# Patient Record
Sex: MALE | Race: ASIAN | NOT HISPANIC OR LATINO | Employment: FULL TIME | URBAN - METROPOLITAN AREA
[De-identification: names, ages, dates, MRNs, and addresses within clinical notes are randomized per-mention and may not be internally consistent; named-entity substitution may affect disease eponyms.]

---

## 2020-03-26 ENCOUNTER — TELEPHONE (OUTPATIENT)
Dept: SCHEDULING | Facility: IMAGING CENTER | Age: 35
End: 2020-03-26

## 2020-03-31 ENCOUNTER — APPOINTMENT (OUTPATIENT)
Dept: MEDICAL GROUP | Facility: MEDICAL CENTER | Age: 35
End: 2020-03-31
Payer: COMMERCIAL

## 2020-06-22 ENCOUNTER — TELEPHONE (OUTPATIENT)
Dept: SCHEDULING | Facility: IMAGING CENTER | Age: 35
End: 2020-06-22

## 2020-07-21 ENCOUNTER — OFFICE VISIT (OUTPATIENT)
Dept: MEDICAL GROUP | Facility: MEDICAL CENTER | Age: 35
End: 2020-07-21
Payer: COMMERCIAL

## 2020-07-21 VITALS
RESPIRATION RATE: 16 BRPM | HEIGHT: 67 IN | SYSTOLIC BLOOD PRESSURE: 116 MMHG | HEART RATE: 85 BPM | BODY MASS INDEX: 21.8 KG/M2 | TEMPERATURE: 97.1 F | DIASTOLIC BLOOD PRESSURE: 61 MMHG | OXYGEN SATURATION: 95 % | WEIGHT: 138.89 LBS

## 2020-07-21 DIAGNOSIS — Z76.89 ENCOUNTER TO ESTABLISH CARE: Primary | ICD-10-CM

## 2020-07-21 DIAGNOSIS — G89.29 CHRONIC NONINTRACTABLE HEADACHE, UNSPECIFIED HEADACHE TYPE: ICD-10-CM

## 2020-07-21 DIAGNOSIS — R51.9 CHRONIC NONINTRACTABLE HEADACHE, UNSPECIFIED HEADACHE TYPE: ICD-10-CM

## 2020-07-21 DIAGNOSIS — R13.10 DYSPHAGIA, UNSPECIFIED TYPE: ICD-10-CM

## 2020-07-21 DIAGNOSIS — J30.9 ALLERGIC RHINITIS, UNSPECIFIED SEASONALITY, UNSPECIFIED TRIGGER: ICD-10-CM

## 2020-07-21 DIAGNOSIS — J34.9 SINUS PROBLEM: ICD-10-CM

## 2020-07-21 DIAGNOSIS — Z23 NEED FOR VACCINATION: ICD-10-CM

## 2020-07-21 DIAGNOSIS — E78.49 OTHER HYPERLIPIDEMIA: ICD-10-CM

## 2020-07-21 PROBLEM — R13.12 OROPHARYNGEAL DYSPHAGIA: Status: ACTIVE | Noted: 2020-07-21

## 2020-07-21 PROCEDURE — 90471 IMMUNIZATION ADMIN: CPT | Performed by: FAMILY MEDICINE

## 2020-07-21 PROCEDURE — 99204 OFFICE O/P NEW MOD 45 MIN: CPT | Mod: 25 | Performed by: FAMILY MEDICINE

## 2020-07-21 PROCEDURE — 90715 TDAP VACCINE 7 YRS/> IM: CPT | Performed by: FAMILY MEDICINE

## 2020-07-21 RX ORDER — SUMATRIPTAN 25 MG/1
TABLET, FILM COATED ORAL
Qty: 30 TAB | Refills: 1 | Status: SHIPPED | OUTPATIENT
Start: 2020-07-21 | End: 2020-09-23 | Stop reason: SDUPTHER

## 2020-07-21 RX ORDER — FEXOFENADINE HCL 180 MG/1
180 TABLET ORAL DAILY
Qty: 90 TAB | Refills: 1 | Status: SHIPPED | OUTPATIENT
Start: 2020-07-21 | End: 2021-10-08

## 2020-07-21 RX ORDER — FEXOFENADINE HCL 180 MG/1
180 TABLET ORAL DAILY
COMMUNITY
End: 2020-07-21 | Stop reason: SDUPTHER

## 2020-07-21 SDOH — HEALTH STABILITY: MENTAL HEALTH: HOW OFTEN DO YOU HAVE A DRINK CONTAINING ALCOHOL?: NEVER

## 2020-07-21 ASSESSMENT — ENCOUNTER SYMPTOMS
HEADACHES: 1
SINUS PAIN: 0
FEVER: 0
FOCAL WEAKNESS: 0
ABDOMINAL PAIN: 0
PALPITATIONS: 0
VOMITING: 0
DIZZINESS: 0
CONSTIPATION: 0
CHILLS: 0
DIARRHEA: 0
MYALGIAS: 0
DEPRESSION: 0
EYE PAIN: 0
COUGH: 0
WHEEZING: 0
HEMOPTYSIS: 0
EYE REDNESS: 0
WEIGHT LOSS: 0
NAUSEA: 0
SPUTUM PRODUCTION: 0
SENSORY CHANGE: 0
SHORTNESS OF BREATH: 0
NERVOUS/ANXIOUS: 0
EYE DISCHARGE: 0

## 2020-07-21 ASSESSMENT — PATIENT HEALTH QUESTIONNAIRE - PHQ9: CLINICAL INTERPRETATION OF PHQ2 SCORE: 0

## 2020-07-21 ASSESSMENT — LIFESTYLE VARIABLES: SUBSTANCE_ABUSE: 0

## 2020-07-21 NOTE — ASSESSMENT & PLAN NOTE
This is a chronic problem for this patient.  He reports that he is currently not on any medication.  He is trying to manage with diet.

## 2020-07-21 NOTE — PROGRESS NOTES
FAMILY MEDICINE VISIT                                                               Chief complaint::The primary encounter diagnosis was Encounter to establish care. Diagnoses of Other hyperlipidemia, Dysphagia, unspecified type, Allergic rhinitis, unspecified seasonality, unspecified trigger, Chronic nonintractable headache, unspecified headache type, Need for vaccination, and Sinus problem were also pertinent to this visit.    History of present illness: Jeanmarie Amaya is a 34 y.o. male who presented to establish care.  Patient is here with her wife and nephew.      Other hyperlipidemia  This is a chronic problem for this patient.  He reports that he is currently not on any medication.  He is trying to manage with diet.    Oropharyngeal dysphagia  Patient reports that he is having difficulty swallowing all foods intermittently since a year.  He reports that it is getting worse.  He reports that he has family history of laryngeal cancer in her mother.  He denies any weight loss.  He reports that he used to smoke and smoked for 6 years but now he is vaping.    Allergic rhinitis  This is a chronic problem for this patient.  He is using Flonase and fexofenadine daily for his allergy symptoms.  He reports that his allergies are controlled with these medications.    Sinus problem  This is a chronic problem for this patient.  Patient moved from [facility].  He reports that he had evaluation there and was diagnosed with sinus cyst, he does not have any records currently.  He reports that he does not know which sinus he had cyst.  He sometimes get pain in his frontal sinuses.  He will get records from [facility] and will bring at next visit    Chronic nonintractable headache  This is a new problem for this patient.  He reports that this started when he moved to .  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  Headache causes him dizziness also.  He is not taking any medication  for headache.  He reports the headache is all over the head, denies any nausea, vomiting, photophobia and phonophobia.  Gets better on its own.    Review of systems:     Review of Systems   Constitutional: Negative for chills, fever, malaise/fatigue and weight loss.   HENT: Negative for ear discharge, ear pain, hearing loss and sinus pain.         Difficulty swallowing food.   Eyes: Negative for pain, discharge and redness.   Respiratory: Negative for cough, hemoptysis, sputum production, shortness of breath and wheezing.    Cardiovascular: Negative for chest pain, palpitations and leg swelling.   Gastrointestinal: Negative for abdominal pain, constipation, diarrhea, nausea and vomiting.   Genitourinary: Negative for dysuria, frequency and urgency.   Musculoskeletal: Negative for joint pain and myalgias.   Skin: Negative for itching and rash.   Neurological: Positive for headaches. Negative for dizziness, sensory change and focal weakness.        Headaches sometimes associated with dizziness   Endo/Heme/Allergies: Negative for environmental allergies.   Psychiatric/Behavioral: Negative for depression, substance abuse and suicidal ideas. The patient is not nervous/anxious.         Past Medical, Surgical and Family History:    Past Medical History:   Diagnosis Date   • Hyperlipidemia      Past Surgical History:   Procedure Laterality Date   • OTHER      chest tube placement     Family History   Problem Relation Age of Onset   • Cancer Mother         Laryngeal cancer   • Cancer Sister         colon cancer at 52 year age.        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     • fexofenadine (ALLEGRA) 180 MG tablet Take 1 Tab by mouth every day. 90 Tab 1   • SUMAtriptan (IMITREX) 25 MG Tab tablet Take 1 tablet by mouth as needed for headache, can take another tablet by mouth in 2 hours if not better, Max: 100 mg/day 30 Tab 1     No current facility-administered medications for this visit.         Not on File    Vitals:    /61 (BP Location: Left arm, Patient Position: Sitting, BP Cuff Size: Adult)   Pulse 85   Temp 36.2 °C (97.1 °F)   Resp 16   Ht 1.702 m (5' 7\")   Wt 63 kg (138 lb 14.2 oz)   SpO2 95%  Body mass index is 21.75 kg/m².    Physical Exam:     Physical Exam   Constitutional: He is oriented to person, place, and time and well-developed, well-nourished, and in no distress. No distress.   HENT:   Head: Normocephalic and atraumatic.   Right Ear: External ear normal.   Left Ear: External ear normal.   Nose: Nose normal.   Mouth/Throat: Oropharynx is clear and moist. No oropharyngeal exudate.   No sinus tenderness.   Eyes: Conjunctivae and EOM are normal. Right eye exhibits no discharge. Left eye exhibits no discharge.   Neck: Neck supple. No thyromegaly present.   Cardiovascular: Normal rate, regular rhythm, normal heart sounds and intact distal pulses.   No murmur heard.  Pulmonary/Chest: Effort normal and breath sounds normal. No respiratory distress. He has no wheezes. He has no rales.   Abdominal: Soft. Bowel sounds are normal. He exhibits no distension. There is no abdominal tenderness. There is no guarding.   Musculoskeletal:         General: No tenderness, deformity or edema.   Lymphadenopathy:     He has no cervical adenopathy.   Neurological: He is alert and oriented to person, place, and time. He exhibits normal muscle tone.   Skin: Skin is warm. No rash noted. He is not " diaphoretic.   Psychiatric: Mood, affect and judgment normal.        Assessment/Plan:    Jeanmarie was seen today for establish care.    Diagnoses and all orders for this visit:    Encounter to establish care    Other hyperlipidemia:  · Advised healthy diet and aerobic exercise.  · Check labs CMP, lipid panel, thyroid function test.    -     Comp Metabolic Panel; Future  -     Lipid Profile; Future  -     TSH WITH REFLEX TO FT4; Future    Dysphagia, unspecified type:  · Ordered barium swallow for dysphagia.    -     DX-ESOPHAGUS - [id number]; Future    Allergic rhinitis, unspecified seasonality, unspecified trigger:  · Controlled with current regimen.  · Continue same medication regimen.    -     fexofenadine (ALLEGRA) 180 MG tablet; Take 1 Tab by mouth every day.    Chronic nonintractable headache, unspecified headache type:  · Advised patient to drink at least 64 ounces of water in a day.  · We will start her on sumatriptan as needed for headache.  · Check labs CBC, CMP, thyroid function test, lipid panel.    -     CBC WITHOUT DIFFERENTIAL; Future  -     TSH WITH REFLEX TO FT4; Future  -     SUMAtriptan (IMITREX) 25 MG Tab tablet; Take 1 tablet by mouth as needed for headache, can take another tablet by mouth in 2 hours if not better, Max: 100 mg/day    Need for vaccination:  · Tdap vaccination given today.    -     Tdap =>6yo IM    Sinus problem  · Patient will get previous records regarding sinus cyst.  Will order further evaluation depending upon his records.       Please note that this dictation was created using voice recognition software. I have made every reasonable attempt to correct obvious errors, but I expect that there are errors of grammar and possibly content that I did not discover before finalizing the note.    Follow up in 2 months for lab follow-up.

## 2020-07-21 NOTE — ASSESSMENT & PLAN NOTE
Patient reports that he is having difficulty swallowing all foods intermittently since a year.  He reports that it is getting worse.  He reports that he has family history of laryngeal cancer in her mother.  He denies any weight loss.  He reports that he used to smoke and smoked for 6 years but now he is vaping.

## 2020-07-21 NOTE — ASSESSMENT & PLAN NOTE
This is a new problem for this patient.  He reports that this started when he moved to [place].  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  Headache causes him dizziness also.  He is not taking any medication for headache.  He reports the headache is all over the head, denies any nausea, vomiting, photophobia and phonophobia.  Gets better on its own.

## 2020-07-21 NOTE — ASSESSMENT & PLAN NOTE
This is a chronic problem for this patient.  He is using Flonase and fexofenadine daily for his allergy symptoms.  He reports that his allergies are controlled with these medications.

## 2020-07-21 NOTE — ASSESSMENT & PLAN NOTE
This is a chronic problem for this patient.  Patient moved from [facility].  He reports that he had evaluation there and was diagnosed with sinus cyst, he does not have any records currently.  He reports that he does not know which sinus he had cyst.  He sometimes get pain in his frontal sinuses.  He will get records from [facility] and will bring at next visit

## 2020-09-22 ENCOUNTER — HOSPITAL ENCOUNTER (OUTPATIENT)
Dept: RADIOLOGY | Facility: MEDICAL CENTER | Age: 35
End: 2020-09-22
Attending: FAMILY MEDICINE
Payer: COMMERCIAL

## 2020-09-22 DIAGNOSIS — R13.12 OROPHARYNGEAL DYSPHAGIA: ICD-10-CM

## 2020-09-22 DIAGNOSIS — R13.19 ESOPHAGEAL DYSPHAGIA: ICD-10-CM

## 2020-09-22 DIAGNOSIS — R13.10 DYSPHAGIA, UNSPECIFIED TYPE: ICD-10-CM

## 2020-09-22 PROCEDURE — 92611 MOTION FLUOROSCOPY/SWALLOW: CPT

## 2020-09-22 PROCEDURE — 74230 X-RAY XM SWLNG FUNCJ C+: CPT

## 2020-09-22 NOTE — OP THERAPY EVALUATION
[facility]  Speech-Language Pathology Department  Modified Barium Swallow Study Summary      Patient: Jeanmarie Amaya     Date of Evaluation: 9/22/20    Referring Provider:  Dr. Elgin Alvarado   Fax: [phone]    Patient History/Reason for Referral: Pt was referred for MBS for dysphagia, unspecified from PCP MD to further assess swallow function with PO intake.    Patient joined with nephew Marianela to assist with translation.  Refused to have official .   Reported that for the past year he has difficulty with consuming solids.  Stated that with rice, chicken, and beef he can feel it stuck in right side of pharynx most of the time and the left side intermittently.  Reported that it will go “down slowly” and liquid wash will not always work as it feels that the liquid will remain in that area as well.  Noted that he will have periods of this occurring for 2-3 months and then “it will go away.”  “Feels like I have zit in my throat, there is a lump in there.”  Will have odynophagia at a 3/10 rating intermittently as well.    Patient with past medical history not limited to hyperlipidemia.    Oral Mechanism Exam:   -Dentition: WNL  -Labial: WNL   -Lingual: WNL  -Palatal Elevation: mild decrease  -Laryngeal Elevation: mild decrease  -Cough: Strong   -Vocal Quality: WNL  - Circumlaryngeal Palpation: WNL    Procedure Results:  The examination was conducted with videofluoroscopy from a   Lateral and Anterior view.  The following textures were presented:   Pudding, Diced Fruit, Cookie and Thin Liquid     Structural Abnormalities: N/A    The following observations were made:   (WFL unless otherwise noted)    Oral Phase Thin Liquids  Puree Mixed Solid   Lip Closure       Tongue Control During Bolus Hold       Premature spillage into the valleculae       Premature spillage into the pyriform sinus       Bolus Preparation/ Mastication       Bolus Transport/ Lingual Motion       Oral Residue  after swallow X  Collection X  Collection X  Collection X  Collection   Initiation of Pharyngeal Swallow          Other Observations:   Initiated secondary swallows independently to clear residue         Pharyngeal Phase Thin Liquids  Puree Mixed Solid   Soft Palate Elevation X X X X   Laryngeal Elevation X X X X   Anterior Hyoid Excursion X X X X   Epiglottic Inversion  X X X X   Laryngeal Vestibular Closure       Pharyngeal Stripping Wave       Pharyngoesophageal Segment Opening (PES)       Tongue Base Retraction (BOT) and/or BOT Residue X  Collection  X  Collection X  Collection   Residue in valleculae X  Collection X  Collection X  Collection X  Collection   Residue in piriform sinus(es) X  Collection      Residue on posterior pharyngeal wall (PPW) X  Collection X  Collection     Penetration       Aspiration         Other Observations: Constant R globus sensation with solids.  Slight residue in that area.    Penetration Aspiration Scale:   Score of 1: Neither penetration nor aspiration  Score of 2-5: Penetration  Score of 6-8: Aspiration    1: Material does not enter airway  2: Material enters airway, but remains above vocal folds; Ejected from airway; no stasis  3: Material remains above vocal folds; visible stasis remains  4: Material contacts vocal folds, but is ejected; no stasis  5: Material contacts vocal folds, and is not ejected; visible stasis remains  6: Material passes glottis, but is ejected from airway; No visible subglottic stasis  7: Material passes glottis, but is not ejected from airway; visible subglottic stasis despite patient’s response  8: Material passes glottis, and is not ejected; visible subglottic stasis; Absent patient response                          Esophageal Phase: Retention below level of UES.     Impressions:  Patient with no aspiration or penetration with all intake.  Oral dysphagia characterized by mild oral and BoT residue and decreased BoT retraction.  Pharyngeal dysphagia  characterized by mild vallecular and posterior pharyngeal wall residue; decreased palatal strength and anterior hyoid excursion; no to slight epiglottic inversion; and reduced pharyngeal squeeze.  Esophageal phase with retention.        Patient initiated secondary swallows independently which reduced oropharyngeal residue.    Alternating liquids and solids with small intake decreased overall oropharyngeal and esophageal residue.  At this time poor epiglottic function is not causing aspiration/penetration concerns.  His airway is protected as the bolus passes over the posterior side of the epiglottis without entering the laryngeal vestibular area and continues into UES successfully.  Vallecular residue does occur because of this no to slight inversion, but it doesn’t increase significantly as the epiglottis stays retracted most of the time.  Due to the nature of his epiglottic anatomy, the apex of the cartilage is curled more  towards the laryngeal surface of the epiglottis versus towards base of tongue, it is not allowing bolus to enter vallecular space as expected.  Slight posterior residue noted on the epiglottis is managed and a small enough of an amount where it is not entering the vestibular space before, during, or after the swallow.      Overall mild oropharyngeal dysphagia with decreased overall strength.  With strategies he is able to manage PO intake safely at this time.      Recommendations: Diet: Regular (IDDSI L 7)        Liquid: Thin (IDDSI L )    Medications:   whole with liquid wash      Compensatory Strategies:   Small sips/bites, sitting up 90 degrees, remain sitting for at least 30 minutes after PO intake, alternating liquids and solids, use sauces and gravies as needed, avoid problematic foods     Your patient may benefit from a referral for:       1. Consider referral to GI for recommendations for esophageal dysphagia.    2. Consider referral to ENT to further assesses pharyngeal pain and  structures, ensure that no cause physiologically for poor epiglottic function.    3.   Consider referral to neurology due to decreased oral strength and poor epiglottic to   r/o neurogenic component.    4.   Outpatient speech therapy to address weakness and strategies for improved         swallow function and safety with PO intake.      Thank you for the referral.    For further questions, please call [phone].       Judit Barajas, CCC-SLP

## 2020-09-23 ENCOUNTER — OFFICE VISIT (OUTPATIENT)
Dept: MEDICAL GROUP | Facility: MEDICAL CENTER | Age: 35
End: 2020-09-23
Payer: COMMERCIAL

## 2020-09-23 VITALS
TEMPERATURE: 97.8 F | WEIGHT: 138.89 LBS | SYSTOLIC BLOOD PRESSURE: 102 MMHG | HEIGHT: 67 IN | OXYGEN SATURATION: 97 % | DIASTOLIC BLOOD PRESSURE: 67 MMHG | HEART RATE: 83 BPM | BODY MASS INDEX: 21.8 KG/M2 | RESPIRATION RATE: 16 BRPM

## 2020-09-23 DIAGNOSIS — R51.9 CHRONIC NONINTRACTABLE HEADACHE, UNSPECIFIED HEADACHE TYPE: ICD-10-CM

## 2020-09-23 DIAGNOSIS — E78.49 OTHER HYPERLIPIDEMIA: ICD-10-CM

## 2020-09-23 DIAGNOSIS — R13.12 OROPHARYNGEAL DYSPHAGIA: ICD-10-CM

## 2020-09-23 DIAGNOSIS — R21 SKIN RASH: ICD-10-CM

## 2020-09-23 DIAGNOSIS — G89.29 CHRONIC NONINTRACTABLE HEADACHE, UNSPECIFIED HEADACHE TYPE: ICD-10-CM

## 2020-09-23 PROCEDURE — 99214 OFFICE O/P EST MOD 30 MIN: CPT | Performed by: FAMILY MEDICINE

## 2020-09-23 RX ORDER — SUMATRIPTAN 25 MG/1
TABLET, FILM COATED ORAL
Qty: 30 TAB | Refills: 1 | Status: SHIPPED | OUTPATIENT
Start: 2020-09-23

## 2020-09-23 RX ORDER — TRIAMCINOLONE ACETONIDE 1 MG/G
CREAM TOPICAL
Qty: 45 G | Refills: 1 | Status: SHIPPED | OUTPATIENT
Start: 2020-09-23 | End: 2021-10-08

## 2020-09-23 ASSESSMENT — ENCOUNTER SYMPTOMS
DEPRESSION: 0
DIZZINESS: 0
DIARRHEA: 0
MYALGIAS: 0
COUGH: 0
CONSTIPATION: 0
HEMOPTYSIS: 0
ABDOMINAL PAIN: 0
NAUSEA: 0
NERVOUS/ANXIOUS: 0
FOCAL WEAKNESS: 0
FEVER: 0
PALPITATIONS: 0
SHORTNESS OF BREATH: 0
HEADACHES: 0
WHEEZING: 0
SENSORY CHANGE: 0
BLOOD IN STOOL: 0
CHILLS: 0
VOMITING: 0

## 2020-09-24 NOTE — ASSESSMENT & PLAN NOTE
This is a chronic problem for this patient.  I prescribed him sumatriptan at last visit.  He reports that he has been taking sumatriptan 2-3 times in a week.  He reports that his symptoms better with this medication.  He denies any side effects with this medication.

## 2020-09-24 NOTE — ASSESSMENT & PLAN NOTE
Patient reported having difficulty swallowing food intermittently.  I ordered a barium swallow at last visit which indicated oropharyngeal dysphagia.  We will refer him to GI and speech therapy.

## 2020-09-24 NOTE — ASSESSMENT & PLAN NOTE
This is a chronic problem for this patient.  He reports that he has skin rash over her upper back region on left side.  Reports that he does have a lot of itching in that area.  He denies using any new creams, shampoo, soap.  He has not tried any treatment.

## 2020-09-24 NOTE — PROGRESS NOTES
FAMILY MEDICINE VISIT                                                               Chief complaint::Diagnoses of Other hyperlipidemia, Oropharyngeal dysphagia, Chronic nonintractable headache, unspecified headache type, and Skin rash were pertinent to this visit.    History of present illness: Jeanmarie Amaya is a 34 y.o. male who presented for lab follow-up.    Other hyperlipidemia  This is a chronic problem for this patient.  His recent lipid panel showed elevated total cholesterol at 224, LDL elevated at 164, triglyceride normal at 115 and HDL at 37.  He is currently not on any statin therapy.  His BMI is 21.75.  He reports that he can improve his dietary habits.    Oropharyngeal dysphagia  Patient reported having difficulty swallowing food intermittently.  I ordered a barium swallow at last visit which indicated oropharyngeal dysphagia.  We will refer him to GI and speech therapy.    Chronic nonintractable headache  This is a chronic problem for this patient.  I prescribed him sumatriptan at last visit.  He reports that he has been taking sumatriptan 2-3 times in a week.  He reports that his symptoms better with this medication.  He denies any side effects with this medication.    Skin rash  This is a chronic problem for this patient.  He reports that he has skin rash over her upper back region on left side.  Reports that he does have a lot of itching in that area.  He denies using any new creams, shampoo, soap.  He has not tried any treatment.      Review of systems:     Review of Systems   Constitutional: Negative for chills, fever and malaise/fatigue.   Respiratory: Negative for cough, hemoptysis, shortness of breath and wheezing.    Cardiovascular: Negative for chest pain, palpitations and leg swelling.   Gastrointestinal: Negative for abdominal pain, blood in stool, constipation, diarrhea, nausea and vomiting.   Musculoskeletal: Negative for myalgias.   Skin: Positive for itching and rash.   Neurological:  "Negative for dizziness, sensory change, focal weakness and headaches.   Psychiatric/Behavioral: Negative for depression and suicidal ideas. The patient is not nervous/anxious.         Past Medical, Surgical and Family History:    Past Medical History:   Diagnosis Date   • Hyperlipidemia      Past Surgical History:   Procedure Laterality Date   • OTHER      chest tube placement     Family History   Problem Relation Age of Onset   • Cancer Mother         Laryngeal cancer   • Cancer Sister         colon cancer at 52 year age.        Social History:    Social History     Tobacco Use   • Smoking status: Former Smoker   • Smokeless tobacco: Never Used   • Tobacco comment: smoked for 6 years and now vaping 3-4 times a day   Substance Use Topics   • Alcohol use: Never     Frequency: Never   • Drug use: Never        Medications and Allergies:     Current Outpatient Medications   Medication Sig Dispense Refill   • triamcinolone acetonide (KENALOG) 0.1 % Cream Apply thin layer over affected area twice daily as needed 45 g 1   • SUMAtriptan (IMITREX) 25 MG Tab tablet Take 1 tablet by mouth as needed for headache, can take another tablet by mouth in 2 hours if not better, Max: 100 mg/day 30 Tab 1   • fluticasone (VERAMYST) 27.5 MCG/SPRAY nasal spray Spray 2 Sprays in nose every day.     • fexofenadine (ALLEGRA) 180 MG tablet Take 1 Tab by mouth every day. 90 Tab 1     No current facility-administered medications for this visit.         Not on File    Vitals:    /67 (BP Location: Left arm, Patient Position: Sitting, BP Cuff Size: Adult)   Pulse 83   Temp 36.6 °C (97.8 °F)   Resp 16   Ht 1.702 m (5' 7\")   Wt 63 kg (138 lb 14.2 oz)   SpO2 97%  Body mass index is 21.75 kg/m².    Physical Exam:     Physical Exam   Constitutional: He is well-developed, well-nourished, and in no distress. No distress.   HENT:   Head: Normocephalic and atraumatic.   Eyes: Conjunctivae are normal.   Neck: Neck supple.   Cardiovascular: Normal " rate, regular rhythm and normal heart sounds. Exam reveals no gallop and no friction rub.   No murmur heard.  Pulmonary/Chest: Effort normal and breath sounds normal. No respiratory distress. He has no wheezes. He has no rales.   Musculoskeletal:         General: No deformity or edema.   Neurological: He is alert. Gait normal.   Skin:   Mild erythema over her upper back on the left side, dry skin   Psychiatric: Mood, affect and judgment normal.        Labs: Reviewed below labs with patient.    Total cholesterol 224  HDL 37  Triglyceride 115    Glucose 90  Creatinine 1.04    TSH 1.32  WBC 5.0  Hemoglobin 16    Assessment/Plan:    Diagnoses and all orders for this visit:    Other hyperlipidemia  · Advised healthy diet and aerobic exercise 150 minutes in a week.  · Check labs in 6 months.    -     Comp Metabolic Panel; Future  -     Lipid Profile; Future    Oropharyngeal dysphagia  · Referral to gastroenterology for further evaluation.  · Referral to speech therapy.    -     REFERRAL TO GASTROENTEROLOGY  -     REFERRAL TO SPEECH THERAPY Reason for Therapy: Eval/Treat/Report    Chronic nonintractable headache, unspecified headache type  · Controlled with current regimen.  · Continue same medication regimen.    -     SUMAtriptan (IMITREX) 25 MG Tab tablet; Take 1 tablet by mouth as needed for headache, can take another tablet by mouth in 2 hours if not better, Max: 100 mg/day    Skin rash  · Likely eczematous rash.  · Prescribed Kenalog cream to be used twice daily as needed.  · Advised patient to wash her skin well.    -     triamcinolone acetonide (KENALOG) 0.1 % Cream; Apply thin layer over affected area twice daily as needed         Please note that this dictation was created using voice recognition software. I have made every reasonable attempt to correct obvious errors, but I expect that there are errors of grammar and possibly content that I did not discover before finalizing the note.    Follow up  in 6 months for lab follow-up.

## 2020-09-24 NOTE — ASSESSMENT & PLAN NOTE
This is a chronic problem for this patient.  His recent lipid panel showed elevated total cholesterol at 224, LDL elevated at 164, triglyceride normal at 115 and HDL at 37.  He is currently not on any statin therapy.  His BMI is 21.75.  He reports that he can improve his dietary habits.

## 2020-12-12 ENCOUNTER — PATIENT MESSAGE (OUTPATIENT)
Dept: MEDICAL GROUP | Facility: MEDICAL CENTER | Age: 35
End: 2020-12-12

## 2020-12-12 DIAGNOSIS — Z11.59 ENCOUNTER FOR SCREENING FOR OTHER VIRAL DISEASES: ICD-10-CM

## 2020-12-29 ENCOUNTER — HOSPITAL ENCOUNTER (OUTPATIENT)
Facility: MEDICAL CENTER | Age: 35
End: 2020-12-29
Attending: PHYSICIAN ASSISTANT
Payer: COMMERCIAL

## 2020-12-29 ENCOUNTER — OFFICE VISIT (OUTPATIENT)
Dept: URGENT CARE | Facility: CLINIC | Age: 35
End: 2020-12-29
Payer: COMMERCIAL

## 2020-12-29 VITALS
OXYGEN SATURATION: 99 % | RESPIRATION RATE: 16 BRPM | SYSTOLIC BLOOD PRESSURE: 122 MMHG | BODY MASS INDEX: 21.45 KG/M2 | TEMPERATURE: 97.6 F | HEART RATE: 96 BPM | WEIGHT: 136.69 LBS | DIASTOLIC BLOOD PRESSURE: 80 MMHG | HEIGHT: 67 IN

## 2020-12-29 DIAGNOSIS — U07.1 COVID-19: ICD-10-CM

## 2020-12-29 PROCEDURE — U0003 INFECTIOUS AGENT DETECTION BY NUCLEIC ACID (DNA OR RNA); SEVERE ACUTE RESPIRATORY SYNDROME CORONAVIRUS 2 (SARS-COV-2) (CORONAVIRUS DISEASE [COVID-19]), AMPLIFIED PROBE TECHNIQUE, MAKING USE OF HIGH THROUGHPUT TECHNOLOGIES AS DESCRIBED BY CMS-2020-01-R: HCPCS

## 2020-12-29 PROCEDURE — 99213 OFFICE O/P EST LOW 20 MIN: CPT | Mod: CS | Performed by: PHYSICIAN ASSISTANT

## 2020-12-29 ASSESSMENT — ENCOUNTER SYMPTOMS
CHILLS: 0
MUSCULOSKELETAL NEGATIVE: 1
DIARRHEA: 0
NAUSEA: 0
HEADACHES: 1
VOMITING: 0
DIZZINESS: 0
SHORTNESS OF BREATH: 0
COUGH: 0
FEVER: 0
ABDOMINAL PAIN: 0
SORE THROAT: 0

## 2020-12-30 DIAGNOSIS — U07.1 COVID-19: ICD-10-CM

## 2020-12-30 LAB
COVID ORDER STATUS COVID19: NORMAL
SARS-COV-2 RNA RESP QL NAA+PROBE: DETECTED
SPECIMEN SOURCE: ABNORMAL

## 2020-12-30 NOTE — PROGRESS NOTES
"Subjective:      Jeanmarie Amaya is a 35 y.o. male who presents with Headache (pt states he was tested for covid 15 days ago and still having symptoms)            HPI   Patient presents to urgent care reporting ongoing covid-19 symptoms. He developed cough, headaches, and fatigue 15 days ago and subsequently tested positive for covid-19. He is requesting repeat testing to return to work. No recent fevers, chills, body aches, chest pain, SOB, or hemoptysis. He has no known medical problems apart from occasional migraines and doesn't take any regular medications.       Review of Systems   Constitutional: Negative for chills and fever.   HENT: Positive for congestion. Negative for ear pain and sore throat.    Respiratory: Negative for cough and shortness of breath.    Cardiovascular: Negative for chest pain.   Gastrointestinal: Negative for abdominal pain, diarrhea, nausea and vomiting.   Genitourinary: Negative.    Musculoskeletal: Negative.    Skin: Negative for rash.   Neurological: Positive for headaches. Negative for dizziness.        Objective:     /80 (BP Location: Left arm, Patient Position: Sitting, BP Cuff Size: Adult long)   Pulse 96   Temp 36.4 °C (97.6 °F) (Temporal)   Resp 16   Ht 1.702 m (5' 7\")   Wt 62 kg (136 lb 11 oz)   SpO2 99%   BMI 21.41 kg/m²      Physical Exam  Vitals signs and nursing note reviewed.   Constitutional:       General: He is not in acute distress.     Appearance: Normal appearance. He is well-developed.   HENT:      Head: Normocephalic and atraumatic.      Right Ear: Tympanic membrane, ear canal and external ear normal. There is no impacted cerumen.      Left Ear: Tympanic membrane, ear canal and external ear normal. There is no impacted cerumen.   Eyes:      Conjunctiva/sclera: Conjunctivae normal.   Neck:      Musculoskeletal: Normal range of motion.   Cardiovascular:      Rate and Rhythm: Normal rate and regular rhythm.      Heart sounds: Normal heart sounds. No " murmur.   Pulmonary:      Effort: Pulmonary effort is normal.      Breath sounds: Normal breath sounds. No wheezing or rales.   Musculoskeletal: Normal range of motion.   Skin:     General: Skin is warm and dry.   Neurological:      Mental Status: He is alert and oriented to person, place, and time.   Psychiatric:         Behavior: Behavior normal.            PMH:  has a past medical history of Hyperlipidemia.  MEDS:   Current Outpatient Medications:   •  triamcinolone acetonide (KENALOG) 0.1 % Cream, Apply thin layer over affected area twice daily as needed, Disp: 45 g, Rfl: 1  •  SUMAtriptan (IMITREX) 25 MG Tab tablet, Take 1 tablet by mouth as needed for headache, can take another tablet by mouth in 2 hours if not better, Max: 100 mg/day, Disp: 30 Tab, Rfl: 1  •  fluticasone (VERAMYST) 27.5 MCG/SPRAY nasal spray, Spray 2 Sprays in nose every day., Disp: , Rfl:   •  fexofenadine (ALLEGRA) 180 MG tablet, Take 1 Tab by mouth every day., Disp: 90 Tab, Rfl: 1  ALLERGIES: No Known Allergies  SURGHX:   Past Surgical History:   Procedure Laterality Date   • OTHER      chest tube placement     SOCHX:  reports that he has quit smoking. He has never used smokeless tobacco. He reports that he does not drink alcohol or use drugs.  FH: family history includes Cancer in his mother and sister.       Assessment/Plan:        1. COVID-19    - COVID/SARS COV-2 PCR; Future    Patient's symptoms have been improving and it has now been 15 days since symptoms starting. Will provide repeat testing at this time but I cautioned patient that it may still be positive, which doesn't mean that he is still contagious. Will not provide additional testing after this time, which he understands.

## 2021-01-07 ENCOUNTER — HOSPITAL ENCOUNTER (OUTPATIENT)
Facility: MEDICAL CENTER | Age: 36
End: 2021-01-07
Attending: PHYSICIAN ASSISTANT
Payer: COMMERCIAL

## 2021-01-07 ENCOUNTER — APPOINTMENT (OUTPATIENT)
Dept: URGENT CARE | Facility: CLINIC | Age: 36
End: 2021-01-07
Payer: COMMERCIAL

## 2021-01-07 PROCEDURE — U0005 INFEC AGEN DETEC AMPLI PROBE: HCPCS

## 2021-01-07 PROCEDURE — U0003 INFECTIOUS AGENT DETECTION BY NUCLEIC ACID (DNA OR RNA); SEVERE ACUTE RESPIRATORY SYNDROME CORONAVIRUS 2 (SARS-COV-2) (CORONAVIRUS DISEASE [COVID-19]), AMPLIFIED PROBE TECHNIQUE, MAKING USE OF HIGH THROUGHPUT TECHNOLOGIES AS DESCRIBED BY CMS-2020-01-R: HCPCS

## 2021-01-08 LAB
COVID ORDER STATUS COVID19: NORMAL
SARS-COV-2 RNA RESP QL NAA+PROBE: NOTDETECTED
SPECIMEN SOURCE: NORMAL

## 2021-03-26 ENCOUNTER — OFFICE VISIT (OUTPATIENT)
Dept: MEDICAL GROUP | Facility: MEDICAL CENTER | Age: 36
End: 2021-03-26
Payer: COMMERCIAL

## 2021-03-26 VITALS
WEIGHT: 136.69 LBS | DIASTOLIC BLOOD PRESSURE: 62 MMHG | RESPIRATION RATE: 16 BRPM | OXYGEN SATURATION: 98 % | HEIGHT: 67 IN | TEMPERATURE: 97.9 F | HEART RATE: 85 BPM | SYSTOLIC BLOOD PRESSURE: 118 MMHG | BODY MASS INDEX: 21.45 KG/M2

## 2021-03-26 DIAGNOSIS — J34.2 DEVIATED NASAL SEPTUM: ICD-10-CM

## 2021-03-26 DIAGNOSIS — E78.49 OTHER HYPERLIPIDEMIA: ICD-10-CM

## 2021-03-26 DIAGNOSIS — J33.8 MAXILLARY POLYP OF SINUS: ICD-10-CM

## 2021-03-26 PROCEDURE — 99214 OFFICE O/P EST MOD 30 MIN: CPT | Performed by: FAMILY MEDICINE

## 2021-03-26 RX ORDER — ATORVASTATIN CALCIUM 10 MG/1
10 TABLET, FILM COATED ORAL DAILY
Qty: 90 TABLET | Refills: 3 | Status: SHIPPED | OUTPATIENT
Start: 2021-03-26

## 2021-03-26 ASSESSMENT — PATIENT HEALTH QUESTIONNAIRE - PHQ9: CLINICAL INTERPRETATION OF PHQ2 SCORE: 0

## 2021-03-26 NOTE — PROGRESS NOTES
FAMILY MEDICINE VISIT                                                               Chief complaint::Diagnoses of Other hyperlipidemia, Maxillary polyp of sinus, and Deviated nasal septum were pertinent to this visit.    History of present illness: Jeanmarie Amaya is a 35 y.o. male who presented for lab follow-up, to discuss about MRI results.    He did labs at [facility].  Recent lipid panel showed total cholesterol at 242, HDL 36, triglyceride 148, .  Reviewed comprehensive metabolic panel which came back negative for any abnormality.  He is currently not taking any medication for cholesterol.  He reports that he went to [facility] and he ate meat two times a day for a month or so.    He had MRI brain and sinuses done on 02/14/2021 there also which    showed normal MRI of brain, bilateral maxillary antral retention cyst/polyp, DNS towards left, hypertrophic inferior nasal turbinates.    He uses flonase daily as needed for congestion symptoms.    Review of systems:     Review of Systems   Constitutional: Negative for chills, fever and malaise/fatigue.   Respiratory: Negative for cough, shortness of breath and wheezing.    Cardiovascular: Negative for chest pain, palpitations and leg swelling.        Past Medical, Surgical and Family History:    Past Medical History:   Diagnosis Date   • Hyperlipidemia      Past Surgical History:   Procedure Laterality Date   • OTHER      chest tube placement     Family History   Problem Relation Age of Onset   • Cancer Mother         Laryngeal cancer   • Cancer Sister         colon cancer at 52 year age.        Social History:    Social History     Tobacco Use   • Smoking status: Former Smoker   • Smokeless tobacco: Never Used   • Tobacco comment: smoked for 6 years and now vaping 3-4 times a day   Substance Use Topics   • Alcohol use: Never   • Drug use: Never        Medications and Allergies:     Current Outpatient Medications   Medication Sig Dispense Refill   • atorvastatin  "(LIPITOR) 10 MG Tab Take 1 tablet by mouth every day. 90 tablet 3   • triamcinolone acetonide (KENALOG) 0.1 % Cream Apply thin layer over affected area twice daily as needed 45 g 1   • SUMAtriptan (IMITREX) 25 MG Tab tablet Take 1 tablet by mouth as needed for headache, can take another tablet by mouth in 2 hours if not better, Max: 100 mg/day 30 Tab 1   • fluticasone (VERAMYST) 27.5 MCG/SPRAY nasal spray Spray 2 Sprays in nose every day.     • fexofenadine (ALLEGRA) 180 MG tablet Take 1 Tab by mouth every day. 90 Tab 1     No current facility-administered medications for this visit.        No Known Allergies    Vitals:    /62 (BP Location: Left arm, Patient Position: Sitting, BP Cuff Size: Adult)   Pulse 85   Temp 36.6 °C (97.9 °F)   Resp 16   Ht 1.702 m (5' 7\")   Wt 62 kg (136 lb 11 oz)   SpO2 98%  Body mass index is 21.41 kg/m².    Physical Exam:     Physical Exam   Constitutional: He is well-developed, well-nourished, and in no distress. No distress.   HENT:   Head: Normocephalic and atraumatic.   Eyes: Conjunctivae are normal.   Cardiovascular: Normal rate.   Pulmonary/Chest: Effort normal. No respiratory distress.   Musculoskeletal:         General: No deformity or edema.      Cervical back: Neck supple.   Neurological: He is alert. Gait normal.   Skin: No rash noted.   Psychiatric: Mood, affect and judgment normal.        Labs:  I reviewed with patient recent labs lipid panel and CMP and MRI brain results.    Assessment/Plan:    1. Other hyperlipidemia  Significantly uncontrolled.  His BMI is at 21.1.  Hypercholesterolemia is likely due to diet and there is likely genetic component also.  Start Lipitor 10 mg once daily.  We discussed side effects of medication including muscle pain.  Monitor liver function tests closely.  Repeat labs to do in 6 months.    - atorvastatin (LIPITOR) 10 MG Tab; Take 1 tablet by mouth every day.  Dispense: 90 tablet; Refill: 3  - Lipid Profile; Future  - Comp Metabolic " Panel; Future    2. Maxillary polyp of sinus  - REFERRAL TO ENT    3. Deviated nasal septum  - REFERRAL TO ENT       Referral to ENT for further evaluation and management of sinus polyps and deviated nasal septum.    Discussed with patient diagnosis, management options, and risk, benefits to treatment plan agreed upon.    Please note that this dictation was created using voice recognition software. I have made every reasonable attempt to correct obvious errors, but I expect that there are errors of grammar and possibly content that I did not discover before finalizing the note.    Follow up in 6 months for lab follow-up.

## 2021-03-27 PROBLEM — J33.8 MAXILLARY POLYP OF SINUS: Status: ACTIVE | Noted: 2021-03-27

## 2021-03-27 PROBLEM — J34.2 DEVIATED NASAL SEPTUM: Status: ACTIVE | Noted: 2021-03-27

## 2021-03-27 ASSESSMENT — ENCOUNTER SYMPTOMS
FEVER: 0
WHEEZING: 0
CHILLS: 0
PALPITATIONS: 0
COUGH: 0
SHORTNESS OF BREATH: 0

## 2021-10-08 ENCOUNTER — OFFICE VISIT (OUTPATIENT)
Dept: MEDICAL GROUP | Facility: MEDICAL CENTER | Age: 36
End: 2021-10-08
Payer: COMMERCIAL

## 2021-10-08 VITALS
HEART RATE: 75 BPM | DIASTOLIC BLOOD PRESSURE: 68 MMHG | SYSTOLIC BLOOD PRESSURE: 122 MMHG | BODY MASS INDEX: 20.54 KG/M2 | HEIGHT: 67 IN | TEMPERATURE: 98.1 F | WEIGHT: 130.84 LBS | OXYGEN SATURATION: 98 %

## 2021-10-08 DIAGNOSIS — J34.9 SINUS PROBLEM: ICD-10-CM

## 2021-10-08 DIAGNOSIS — E78.49 OTHER HYPERLIPIDEMIA: ICD-10-CM

## 2021-10-08 DIAGNOSIS — G43.009 MIGRAINE WITHOUT AURA AND WITHOUT STATUS MIGRAINOSUS, NOT INTRACTABLE: ICD-10-CM

## 2021-10-08 PROCEDURE — 99214 OFFICE O/P EST MOD 30 MIN: CPT | Performed by: FAMILY MEDICINE

## 2021-10-08 ASSESSMENT — ENCOUNTER SYMPTOMS
SHORTNESS OF BREATH: 0
FEVER: 0
COUGH: 0
CHILLS: 0
PALPITATIONS: 0
WHEEZING: 0

## 2021-10-08 NOTE — PROGRESS NOTES
FAMILY MEDICINE VISIT                                                               Chief complaint::Diagnoses of Other hyperlipidemia, Sinus problem, and Migraine without aura and without status migrainosus, not intractable were pertinent to this visit.    History of present illness: Jeanmarie Amaya is a 35 y.o. male who presented for lab follow-up.    Other hyperlipidemia  Recent labs showed improvement in cholesterol levels.  His LDL level improved from 1 70-1 31.  Total cholesterol also improved and now in normal range.  HDL level improved from 37-39.  He is taking Lipitor 10 mg once daily.  No side effects with medication    Sinus problem  He is using Flonase nasal spray.  I referred him to ENT as he has maxillary polyp and deviated nasal septum.  He reports that he is waiting to get surgery.    Migraine without aura and without status migrainosus, not intractable  He gets headache few times in a month.  I prescribed him sumatriptan previously which is helping with headaches.  He uses 2-3 times in a week this medication.  No side effects with medication.      Review of systems:     Review of Systems   Constitutional: Negative for chills, fever and malaise/fatigue.   Respiratory: Negative for cough, shortness of breath and wheezing.    Cardiovascular: Negative for chest pain, palpitations and leg swelling.        Past Medical, Surgical and Family History:    Past Medical History:   Diagnosis Date   • Hyperlipidemia      Past Surgical History:   Procedure Laterality Date   • OTHER      chest tube placement     Family History   Problem Relation Age of Onset   • Cancer Mother         Laryngeal cancer   • Cancer Sister         colon cancer at 52 year age.        Social History:    Social History     Tobacco Use   • Smoking status: Former Smoker   • Smokeless tobacco: Never Used   • Tobacco comment: smoked for 6 years and now vaping 3-4 times a day   Vaping Use   • Vaping Use: Every day   Substance Use Topics   •  "Alcohol use: Never   • Drug use: Never        Medications and Allergies:     Current Outpatient Medications   Medication Sig Dispense Refill   • atorvastatin (LIPITOR) 10 MG Tab Take 1 tablet by mouth every day. 90 tablet 3   • SUMAtriptan (IMITREX) 25 MG Tab tablet Take 1 tablet by mouth as needed for headache, can take another tablet by mouth in 2 hours if not better, Max: 100 mg/day 30 Tab 1   • fluticasone (VERAMYST) 27.5 MCG/SPRAY nasal spray Spray 2 Sprays in nose every day.       No current facility-administered medications for this visit.        No Known Allergies    Vitals:    /68 (BP Location: Left arm, Patient Position: Sitting, BP Cuff Size: Adult)   Pulse 75   Temp 36.7 °C (98.1 °F) (Temporal)   Ht 1.702 m (5' 7\")   Wt 59.4 kg (130 lb 13.5 oz)   SpO2 98%  Body mass index is 20.49 kg/m².    Physical Exam:     Physical Exam  Constitutional:       General: He is not in acute distress.  HENT:      Head: Normocephalic and atraumatic.   Eyes:      Conjunctiva/sclera: Conjunctivae normal.   Cardiovascular:      Rate and Rhythm: Normal rate.   Pulmonary:      Effort: Pulmonary effort is normal. No respiratory distress.   Musculoskeletal:         General: No deformity.      Cervical back: Neck supple.   Skin:     Findings: No rash.   Neurological:      Mental Status: He is alert.      Gait: Gait is intact.   Psychiatric:         Mood and Affect: Mood and affect normal.         Judgment: Judgment normal.          Labs:  I reviewed with patient recent labs CMP, lipid panel.    Assessment/Plan:    1. Other hyperlipidemia  Chronic health problem, improving, continue Lipitor 10 mg once daily.  Continue to eat healthy diet and aerobic exercise.    - Comp Metabolic Panel; Future  - Lipid Profile; Future    2. Sinus problem  Chronic health problem, stable, continue Flonase nasal spray.  Follow-up with ENT for surgery.    3. Migraine without aura and without status migrainosus, not intractable  Chronic health " problem, stable, continue Imitrex as needed.       Please note that this dictation was created using voice recognition software. I have made every reasonable attempt to correct obvious errors, but I expect that there are errors of grammar and possibly content that I did not discover before finalizing the note.    Follow up in 6 months for lab follow-up.

## 2021-10-08 NOTE — ASSESSMENT & PLAN NOTE
He is using Flonase nasal spray.  I referred him to ENT as he has maxillary polyp and deviated nasal septum.  He reports that he is waiting to get surgery.

## 2021-10-08 NOTE — ASSESSMENT & PLAN NOTE
He gets headache few times in a month.  I prescribed him sumatriptan previously which is helping with headaches.  He uses 2-3 times in a week this medication.  No side effects with medication.

## 2021-10-08 NOTE — ASSESSMENT & PLAN NOTE
Recent labs showed improvement in cholesterol levels.  His LDL level improved from 1 70-1 31.  Total cholesterol also improved and now in normal range.  HDL level improved from 37-39.  He is taking Lipitor 10 mg once daily.  No side effects with medication

## 2022-04-15 ENCOUNTER — APPOINTMENT (OUTPATIENT)
Dept: MEDICAL GROUP | Facility: MEDICAL CENTER | Age: 37
End: 2022-04-15

## 2023-05-27 ENCOUNTER — HOSPITAL ENCOUNTER (EMERGENCY)
Facility: MEDICAL CENTER | Age: 38
End: 2023-05-28
Attending: STUDENT IN AN ORGANIZED HEALTH CARE EDUCATION/TRAINING PROGRAM
Payer: COMMERCIAL

## 2023-05-27 VITALS
SYSTOLIC BLOOD PRESSURE: 122 MMHG | HEART RATE: 61 BPM | OXYGEN SATURATION: 98 % | WEIGHT: 136.69 LBS | TEMPERATURE: 97.5 F | HEIGHT: 67 IN | RESPIRATION RATE: 16 BRPM | BODY MASS INDEX: 21.45 KG/M2 | DIASTOLIC BLOOD PRESSURE: 79 MMHG

## 2023-05-27 DIAGNOSIS — R11.0 NAUSEA: ICD-10-CM

## 2023-05-27 DIAGNOSIS — R07.9 CHEST PAIN, UNSPECIFIED TYPE: ICD-10-CM

## 2023-05-27 DIAGNOSIS — R10.84 GENERALIZED ABDOMINAL PAIN: ICD-10-CM

## 2023-05-27 LAB
ALBUMIN SERPL BCP-MCNC: 4.4 G/DL (ref 3.2–4.9)
ALBUMIN/GLOB SERPL: 1.5 G/DL
ALP SERPL-CCNC: 67 U/L (ref 30–99)
ALT SERPL-CCNC: 32 U/L (ref 2–50)
ANION GAP SERPL CALC-SCNC: 12 MMOL/L (ref 7–16)
AST SERPL-CCNC: 23 U/L (ref 12–45)
BASOPHILS # BLD AUTO: 0.9 % (ref 0–1.8)
BASOPHILS # BLD: 0.06 K/UL (ref 0–0.12)
BILIRUB SERPL-MCNC: 0.2 MG/DL (ref 0.1–1.5)
BUN SERPL-MCNC: 15 MG/DL (ref 8–22)
CALCIUM ALBUM COR SERPL-MCNC: 9.5 MG/DL (ref 8.5–10.5)
CALCIUM SERPL-MCNC: 9.8 MG/DL (ref 8.4–10.2)
CHLORIDE SERPL-SCNC: 105 MMOL/L (ref 96–112)
CO2 SERPL-SCNC: 23 MMOL/L (ref 20–33)
CREAT SERPL-MCNC: 0.87 MG/DL (ref 0.5–1.4)
EKG IMPRESSION: NORMAL
EOSINOPHIL # BLD AUTO: 0.12 K/UL (ref 0–0.51)
EOSINOPHIL NFR BLD: 1.8 % (ref 0–6.9)
ERYTHROCYTE [DISTWIDTH] IN BLOOD BY AUTOMATED COUNT: 37.5 FL (ref 35.9–50)
GFR SERPLBLD CREATININE-BSD FMLA CKD-EPI: 112 ML/MIN/1.73 M 2
GLOBULIN SER CALC-MCNC: 3 G/DL (ref 1.9–3.5)
GLUCOSE SERPL-MCNC: 90 MG/DL (ref 65–99)
HCT VFR BLD AUTO: 47.6 % (ref 42–52)
HGB BLD-MCNC: 15.9 G/DL (ref 14–18)
IMM GRANULOCYTES # BLD AUTO: 0.01 K/UL (ref 0–0.11)
IMM GRANULOCYTES NFR BLD AUTO: 0.2 % (ref 0–0.9)
LIPASE SERPL-CCNC: 69 U/L (ref 7–58)
LYMPHOCYTES # BLD AUTO: 2.23 K/UL (ref 1–4.8)
LYMPHOCYTES NFR BLD: 33.9 % (ref 22–41)
MCH RBC QN AUTO: 29.6 PG (ref 27–33)
MCHC RBC AUTO-ENTMCNC: 33.4 G/DL (ref 32.3–36.5)
MCV RBC AUTO: 88.6 FL (ref 81.4–97.8)
MONOCYTES # BLD AUTO: 0.45 K/UL (ref 0–0.85)
MONOCYTES NFR BLD AUTO: 6.8 % (ref 0–13.4)
NEUTROPHILS # BLD AUTO: 3.7 K/UL (ref 1.82–7.42)
NEUTROPHILS NFR BLD: 56.4 % (ref 44–72)
NRBC # BLD AUTO: 0 K/UL
NRBC BLD-RTO: 0 /100 WBC (ref 0–0.2)
PLATELET # BLD AUTO: 222 K/UL (ref 164–446)
PMV BLD AUTO: 10.3 FL (ref 9–12.9)
POTASSIUM SERPL-SCNC: 3.5 MMOL/L (ref 3.6–5.5)
PROT SERPL-MCNC: 7.4 G/DL (ref 6–8.2)
RBC # BLD AUTO: 5.37 M/UL (ref 4.7–6.1)
SODIUM SERPL-SCNC: 140 MMOL/L (ref 135–145)
TROPONIN T SERPL-MCNC: <6 NG/L (ref 6–19)
WBC # BLD AUTO: 6.6 K/UL (ref 4.8–10.8)

## 2023-05-27 PROCEDURE — 96374 THER/PROPH/DIAG INJ IV PUSH: CPT

## 2023-05-27 PROCEDURE — 80053 COMPREHEN METABOLIC PANEL: CPT

## 2023-05-27 PROCEDURE — 93005 ELECTROCARDIOGRAM TRACING: CPT | Performed by: STUDENT IN AN ORGANIZED HEALTH CARE EDUCATION/TRAINING PROGRAM

## 2023-05-27 PROCEDURE — 93005 ELECTROCARDIOGRAM TRACING: CPT

## 2023-05-27 PROCEDURE — 700111 HCHG RX REV CODE 636 W/ 250 OVERRIDE (IP): Performed by: STUDENT IN AN ORGANIZED HEALTH CARE EDUCATION/TRAINING PROGRAM

## 2023-05-27 PROCEDURE — A9270 NON-COVERED ITEM OR SERVICE: HCPCS | Performed by: STUDENT IN AN ORGANIZED HEALTH CARE EDUCATION/TRAINING PROGRAM

## 2023-05-27 PROCEDURE — 84484 ASSAY OF TROPONIN QUANT: CPT

## 2023-05-27 PROCEDURE — 700102 HCHG RX REV CODE 250 W/ 637 OVERRIDE(OP): Performed by: STUDENT IN AN ORGANIZED HEALTH CARE EDUCATION/TRAINING PROGRAM

## 2023-05-27 PROCEDURE — 36415 COLL VENOUS BLD VENIPUNCTURE: CPT

## 2023-05-27 PROCEDURE — 99284 EMERGENCY DEPT VISIT MOD MDM: CPT

## 2023-05-27 PROCEDURE — 85025 COMPLETE CBC W/AUTO DIFF WBC: CPT

## 2023-05-27 PROCEDURE — 83690 ASSAY OF LIPASE: CPT

## 2023-05-27 RX ORDER — FAMOTIDINE 20 MG/1
20 TABLET, FILM COATED ORAL 2 TIMES DAILY
Qty: 30 TABLET | Refills: 0 | Status: SHIPPED | OUTPATIENT
Start: 2023-05-27

## 2023-05-27 RX ORDER — FAMOTIDINE 20 MG/1
20 TABLET, FILM COATED ORAL ONCE
Status: COMPLETED | OUTPATIENT
Start: 2023-05-27 | End: 2023-05-27

## 2023-05-27 RX ORDER — ONDANSETRON 2 MG/ML
4 INJECTION INTRAMUSCULAR; INTRAVENOUS ONCE
Status: COMPLETED | OUTPATIENT
Start: 2023-05-27 | End: 2023-05-27

## 2023-05-27 RX ADMIN — FAMOTIDINE 20 MG: 20 TABLET, FILM COATED ORAL at 22:39

## 2023-05-27 RX ADMIN — ONDANSETRON 4 MG: 2 INJECTION INTRAMUSCULAR; INTRAVENOUS at 22:39

## 2023-05-27 RX ADMIN — LIDOCAINE HYDROCHLORIDE 30 ML: 20 SOLUTION OROPHARYNGEAL at 22:39

## 2023-05-27 ASSESSMENT — LIFESTYLE VARIABLES
AVERAGE NUMBER OF DAYS PER WEEK YOU HAVE A DRINK CONTAINING ALCOHOL: 1
ON A TYPICAL DAY WHEN YOU DRINK ALCOHOL HOW MANY DRINKS DO YOU HAVE: 2
EVER FELT BAD OR GUILTY ABOUT YOUR DRINKING: NO
HAVE YOU EVER FELT YOU SHOULD CUT DOWN ON YOUR DRINKING: NO
CONSUMPTION TOTAL: NEGATIVE
TOTAL SCORE: 0
HOW MANY TIMES IN THE PAST YEAR HAVE YOU HAD 5 OR MORE DRINKS IN A DAY: 0
TOTAL SCORE: 0
EVER HAD A DRINK FIRST THING IN THE MORNING TO STEADY YOUR NERVES TO GET RID OF A HANGOVER: NO
TOTAL SCORE: 0
DO YOU DRINK ALCOHOL: YES
HAVE PEOPLE ANNOYED YOU BY CRITICIZING YOUR DRINKING: NO

## 2023-05-28 NOTE — ED PROVIDER NOTES
ED Provider Note    CHIEF COMPLAINT  Chief Complaint   Patient presents with    Chest Pain     Reports chest pressure, and reports also hx of GERD per pt. Family member.     Abdominal Pain     Generalized abd pain. Denies V/D.       EXTERNAL RECORDS REVIEWED  Outpatient Notes outpatient urgent care visit 9/6/2022 was treated for COVID-19 infection family medicine visit 10/8/2021 patient does have a history of migraines, hyperlipidemia    HPI/ROS  LIMITATION TO HISTORY   Select: Language Farsi,  Used   OUTSIDE HISTORIAN(S):  Friend states that the patient does have a history of GERD and he developed a burning epigastric discomfort after eating pizza that radiated into his chest.    Jeanmarie Amaya is a 37 y.o. male who presents evaluation of a burning epigastric sensation that radiates into his chest.  He is also complaining of diffuse bloating sensation.  He tried taking Tums without relief of his symptoms.  Admits to some nausea though no vomiting.    PAST MEDICAL HISTORY   has a past medical history of GERD (gastroesophageal reflux disease) and Hyperlipidemia.    SURGICAL HISTORY   has a past surgical history that includes other.    FAMILY HISTORY  Family History   Problem Relation Age of Onset    Cancer Mother         Laryngeal cancer    Cancer Sister         colon cancer at 52 year age.       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 (Registered Nurse) on 05/27/23 at 2148  Med List Status: Not Addressed     Medication Last Dose Status   atorvastatin (LIPITOR) 10 MG Tab  Active   fluticasone (VERAMYST) 27.5  "MCG/SPRAY nasal spray  Active   SUMAtriptan (IMITREX) 25 MG Tab tablet  Active                    ALLERGIES  No Known Allergies    PHYSICAL EXAM  VITAL SIGNS: BP (!) 138/95   Pulse 87   Temp 36.3 °C (97.3 °F) (Temporal)   Resp 20   Ht 1.702 m (5' 7\")   Wt 62 kg (136 lb 11 oz)   SpO2 99%   BMI 21.41 kg/m²    Pulse ox interpretation: I interpret this pulse ox as normal.  VITALS - vital signs documented prior to this note have been reviewed and noted,  GENERAL - awake, alert, oriented, GCS 15, no apparent distress, non-toxic  appearing  HEENT - normocephalic, atraumatic, pupils equal, sclera anicteric, mucus  membranes moist  NECK - supple, no meningismus, full active range of motion, trachea midline  CARDIOVASCULAR - regular rate/rhythm, no murmurs/gallops/rubs  PULMONARY - no respiratory distress, speaking in full sentences, clear to  auscultation bilaterally, no wheezing/ronchi/rales, no accessory muscle use  GASTROINTESTINAL - soft, non-tender, non-distended, no rebound, guarding,  or peritonitis  GENITOURINARY - Deferred  NEUROLOGIC - Awake alert, normal mental status, speech fluid, cognition  normal, moves all extremities  MUSCULOSKELETAL - no obvious asymmetry or deformities present  EXTREMITIES - warm, well-perfused, no cyanosis or significant edema  DERMATOLOGIC - warm, dry, no rashes, no jaundice  PSYCHIATRIC - normal affect, normal insight, normal concentration    DIAGNOSTIC STUDIES / PROCEDURES  EKG  I have independently interpreted this EKG   Report   Date Value Ref Range Status   2023       [facility]    Test Date:  2023  Pt Name:    REJI CERNA               Department: [facility]  MRN:        [id number]                      Room:       -ROOM 1  Gender:     Male                         Technician: [id number]  :        1985                   Requested By:ER TRIAGE PROTOCOL  Order #:    [id number]                    Kyle MD: Evgeny " Lilia    Measurements  Intervals                                Axis  Rate:       78                           P:          71  IN:         128                          QRS:        54  QRSD:       90                           T:          61  QT:         370  QTc:        422    Interpretive Statements  Sinus rhythm  No acute ischemic changes  Baseline wander in lead(s) V1  No previous ECG available for comparison  Electronically Signed On 5- 22:16:47 PDT by Evgeny Rodrigues           No STEMI pattern    LABS  Labs Reviewed   COMP METABOLIC PANEL - Abnormal; Notable for the following components:       Result Value    Potassium 3.5 (*)     All other components within normal limits    Narrative:     Biotin intake of greater than 5 mg per day may interfere with  troponin levels, causing false low values.   LIPASE - Abnormal; Notable for the following components:    Lipase 69 (*)     All other components within normal limits    Narrative:     Biotin intake of greater than 5 mg per day may interfere with  troponin levels, causing false low values.   CBC WITH DIFFERENTIAL    Narrative:     Biotin intake of greater than 5 mg per day may interfere with  troponin levels, causing false low values.   TROPONIN    Narrative:     Biotin intake of greater than 5 mg per day may interfere with  troponin levels, causing false low values.   CORRECTED CALCIUM    Narrative:     Biotin intake of greater than 5 mg per day may interfere with  troponin levels, causing false low values.   ESTIMATED GFR    Narrative:     Biotin intake of greater than 5 mg per day may interfere with  troponin levels, causing false low values.      Mildly elevated lipase and no left upper quadrant tenderness nausea or vomiting doubt acute pancreatitis    COURSE & MEDICAL DECISION MAKING    ED Observation Status? No    INITIAL ASSESSMENT, COURSE AND PLAN    A life-threatening diagnoses were considered including but not limited to cholecystitis  pancreatitis ascending cholangitis peptic ulcer disease atypical ACS among many other considerations    Care Narrative: Patient presented for evaluation of epigastric abdominal pain diffuse bloating and burning sensation radiating into his chest.  He has a benign reassuring abdominal exam Labs were obtained he was treated with Zofran Pepcid and GI cocktail.  Labs were obtained showed normal maladies of clinical significance after a GI cocktail and Pepcid patient stated that his symptoms had completely abated, was tolerating p.o. and his repeat abdominal exam is reassuring thus do not see an indication to proceed to CT abdomen pelvis at this time he is instructed to return for an abdominal recheck in 12 to 24 hours should his symptoms not improve or sooner should he develop any new or concerning symptoms.  He will be discharged on Pepcid return precautions were discussed he is discharged in stable condition  HTN/IDDM FOLLOW UP:  The patient is referred to a primary physician for blood pressure management, diabetic screening, and for all other preventive health concerns      ADDITIONAL PROBLEM LIST  Abdominal pain chest pain  DISPOSITION AND DISCUSSIONS      Escalation of care considered, and ultimately not performed:diagnostic imaging    Barriers to care at this time, including but not limited to: Patient does not have established PCP.     Decision tools and prescription drugs considered including, but not limited to: HEART Score 1 .    FINAL DIAGNOSIS  1 abdominal pain  2.  Chest pain  3.  GERD  4.  Nausea       Electronically signed by: Evgeny Raygoza D.O., 5/27/2023 10:12 PM

## 2023-11-09 ENCOUNTER — HOSPITAL ENCOUNTER (EMERGENCY)
Facility: MEDICAL CENTER | Age: 38
End: 2023-11-09
Attending: EMERGENCY MEDICINE
Payer: COMMERCIAL

## 2023-11-09 VITALS
SYSTOLIC BLOOD PRESSURE: 110 MMHG | BODY MASS INDEX: 21.3 KG/M2 | DIASTOLIC BLOOD PRESSURE: 76 MMHG | TEMPERATURE: 98.6 F | OXYGEN SATURATION: 97 % | WEIGHT: 136 LBS | HEART RATE: 79 BPM | RESPIRATION RATE: 18 BRPM

## 2023-11-09 DIAGNOSIS — T78.40XA ALLERGIC REACTION, INITIAL ENCOUNTER: ICD-10-CM

## 2023-11-09 PROCEDURE — 96374 THER/PROPH/DIAG INJ IV PUSH: CPT

## 2023-11-09 PROCEDURE — 700111 HCHG RX REV CODE 636 W/ 250 OVERRIDE (IP)

## 2023-11-09 PROCEDURE — 99284 EMERGENCY DEPT VISIT MOD MDM: CPT

## 2023-11-09 RX ORDER — METHYLPREDNISOLONE 4 MG/1
TABLET ORAL
Qty: 21 EACH | Refills: 0 | Status: SHIPPED | OUTPATIENT
Start: 2023-11-09

## 2023-11-09 RX ORDER — CETIRIZINE HYDROCHLORIDE 10 MG/1
10 TABLET ORAL DAILY
Qty: 30 TABLET | Refills: 0 | Status: SHIPPED | OUTPATIENT
Start: 2023-11-09

## 2023-11-09 RX ORDER — DIPHENHYDRAMINE HYDROCHLORIDE 50 MG/ML
25 INJECTION INTRAMUSCULAR; INTRAVENOUS ONCE
Status: COMPLETED | OUTPATIENT
Start: 2023-11-09 | End: 2023-11-09

## 2023-11-09 RX ADMIN — DIPHENHYDRAMINE HYDROCHLORIDE 25 MG: 50 INJECTION, SOLUTION INTRAMUSCULAR; INTRAVENOUS at 12:34

## 2023-11-09 ASSESSMENT — FIBROSIS 4 INDEX: FIB4 SCORE: 0.71

## 2023-11-09 NOTE — ED NOTES
Pt provided with DC paper work and educated on new medications. Pt declines questions and ambulated out of Er with family as ride home.

## 2023-11-09 NOTE — ED TRIAGE NOTES
Chief Complaint   Patient presents with    Rash     Started yesterday, woke w/ swelling around eyes and welts on extremities and trunk; denies any SOB or oral swelling    Sent from Urgent Care     Seen at  this AM and given M benedryl and decadron, sent to ER for further eval     /87   Pulse 93   Temp 37 °C (98.6 °F) (Temporal)   SpO2 100%     Pt BIB [facility] from  for above; pt states he has not tried anything new that could cause reaction    EMS placed PIV and gave 50 Benadryl

## 2023-11-09 NOTE — ED PROVIDER NOTES
ED PHYSICIAN NOTE    CHIEF COMPLAINT  Chief Complaint   Patient presents with    Rash     Started yesterday, woke w/ swelling around eyes and welts on extremities and trunk; denies any SOB or oral swelling    Sent from Urgent Care     Seen at  this AM and given M benedryl and decadron, sent to ER for further eval       EXTERNAL RECORDS REVIEWED  Outpatient Notes patient was referred from urgent care because of rash thought to be allergic in etiology.  Patient reported difficulty swallowing there was concern for angioedema.  Patient received dexamethasone prior to arrival.    HPI/ROS    OUTSIDE HISTORIAN(S):  EMS vital signs have been stable.  Patient received Benadryl    Mahbubul Jose Luis is a 37 y.o. male who presents with a rash.  Started yesterday.  First swelling around the eyes and then moved down the neck extremities abdomen.  It is very itchy.  No medications at home.  Has not taken any new medications.  No new allergen exposure and this is never happened to him before.  He has not had a fever.  Denies swelling of the lips tongue or mouth.  No difficulty breathing.  This morning he felt an odd sensation when he was swallowing but denies this currently.    No new medications.  No over-the-counter's.    PAST MEDICAL HISTORY  Past Medical History:   Diagnosis Date    GERD (gastroesophageal reflux disease)     Hyperlipidemia        SOCIAL HISTORY  Social History     Tobacco Use    Smoking status: Some Days     Types: Cigarettes    Smokeless tobacco: Never    Tobacco comments:     smoked for 6 years and now vaping 3-4 times a day   Vaping Use    Vaping Use: Every day   Substance Use Topics    Alcohol use: Never    Drug use: Never       CURRENT MEDICATIONS  Home Medications       Reviewed by Danita Howard R.N. (Registered Nurse) on 11/09/23 at 1102  Med List Status: Not Addressed     Medication Last Dose Status   atorvastatin (LIPITOR) 10 MG Tab  Active   famotidine (PEPCID) 20 MG Tab  Active   fluticasone  (VERAMYST) 27.5 MCG/SPRAY nasal spray  Active   SUMAtriptan (IMITREX) 25 MG Tab tablet  Active                    ALLERGIES  No Known Allergies    PHYSICAL EXAM  VITAL SIGNS: /87   Pulse 93   Temp 37 °C (98.6 °F) (Temporal)   SpO2 100%    Constitutional: Awake and alert  HENT: Lips are normal.  Pharynx normal.  Tongue normal.  Airway widely patent.  Eyes: Normal inspection  Neck: Grossly normal range of motion.  Cardiovascular: Normal heart rate, Normal rhythm.  Symmetric peripheral pulses.   Thorax & Lungs: No respiratory distress, No wheezing, No rales, No rhonchi, No chest tenderness.  Normal voice  Abdomen: Bowel sounds normal, soft, non-distended, nontender, no mass  Skin: Generalized urticarial rash.  No rash on palms or soles  Back: No tenderness, No CVA tenderness.   Extremities: No clubbing, cyanosis, edema, no Homans or cords.  Neurologic: Grossly normal   Psychiatric: Normal for situation     DIAGNOSTIC STUDIES / PROCEDURES      COURSE & MEDICAL DECISION MAKING      INITIAL ASSESSMENT, COURSE AND PLAN  Care Narrative: Patient presents with with the generalized urticarial rash consistent with allergic reaction.  His vital signs are normal.  He has no systemic symptoms.  No difficulty breathing, pulmonary findings, edema.  This does not appear to represent anaphylaxis.  He has been treated appropriately prior to arrival.  Patient was observed in the emergency department.  He had no progression of symptoms.  He is appropriate for outpatient management.  Patient will be started on a daily antihistamine for the next week.  He will be given a prescription for Medrol Dosepak.  I prescribed epinephrine pen and given action plan.  The source of his allergic reaction is not clear.  He would benefit from allergist evaluation.  Vies to call and schedule appointment.  Return to ER for worsening, not improving, difficulty breathing or concern      ADDITIONAL PROBLEM LIST  Past Medical History:   Diagnosis Date     GERD (gastroesophageal reflux disease)     Hyperlipidemia      DISPOSITION AND DISCUSSIONS    Escalation of care considered, and ultimately not performed:blood analysis and diagnostic imaging      Prescription drugs considered and/or prescribed: Zyrtec, Medrol, EpiPen    FINAL IMPRESSION  1.  Acute allergic reaction    This dictation was created using voice recognition software. The accuracy of the dictation is limited to the abilities of the software. I expect there may be some errors of grammar and possibly content. The nursing notes were reviewed and certain aspects of this information were incorporated into this note.    Electronically signed by: Garrett Vasquez M.D., 11/9/2023

## 2023-11-14 ENCOUNTER — APPOINTMENT (OUTPATIENT)
Dept: RADIOLOGY | Facility: MEDICAL CENTER | Age: 38
End: 2023-11-14
Attending: EMERGENCY MEDICINE
Payer: COMMERCIAL

## 2023-11-14 ENCOUNTER — HOSPITAL ENCOUNTER (EMERGENCY)
Facility: MEDICAL CENTER | Age: 38
End: 2023-11-14
Attending: EMERGENCY MEDICINE
Payer: COMMERCIAL

## 2023-11-14 VITALS
SYSTOLIC BLOOD PRESSURE: 115 MMHG | HEART RATE: 77 BPM | WEIGHT: 133.6 LBS | BODY MASS INDEX: 20.97 KG/M2 | DIASTOLIC BLOOD PRESSURE: 75 MMHG | OXYGEN SATURATION: 100 % | TEMPERATURE: 98.4 F | RESPIRATION RATE: 18 BRPM | HEIGHT: 67 IN

## 2023-11-14 DIAGNOSIS — R00.0 TACHYCARDIA: ICD-10-CM

## 2023-11-14 DIAGNOSIS — R06.02 SHORTNESS OF BREATH: ICD-10-CM

## 2023-11-14 DIAGNOSIS — T50.905A ADVERSE EFFECT OF DRUG, INITIAL ENCOUNTER: ICD-10-CM

## 2023-11-14 DIAGNOSIS — R00.2 PALPITATIONS: ICD-10-CM

## 2023-11-14 LAB
ALBUMIN SERPL BCP-MCNC: 4.3 G/DL (ref 3.2–4.9)
ALBUMIN/GLOB SERPL: 1.2 G/DL
ALP SERPL-CCNC: 66 U/L (ref 30–99)
ALT SERPL-CCNC: 36 U/L (ref 2–50)
ANION GAP SERPL CALC-SCNC: 13 MMOL/L (ref 7–16)
AST SERPL-CCNC: 17 U/L (ref 12–45)
BASOPHILS # BLD AUTO: 0.3 % (ref 0–1.8)
BASOPHILS # BLD: 0.03 K/UL (ref 0–0.12)
BILIRUB SERPL-MCNC: 0.3 MG/DL (ref 0.1–1.5)
BUN SERPL-MCNC: 13 MG/DL (ref 8–22)
CALCIUM ALBUM COR SERPL-MCNC: 9.4 MG/DL (ref 8.5–10.5)
CALCIUM SERPL-MCNC: 9.6 MG/DL (ref 8.4–10.2)
CHLORIDE SERPL-SCNC: 104 MMOL/L (ref 96–112)
CO2 SERPL-SCNC: 23 MMOL/L (ref 20–33)
CREAT SERPL-MCNC: 0.83 MG/DL (ref 0.5–1.4)
EKG IMPRESSION: NORMAL
EOSINOPHIL # BLD AUTO: 0.05 K/UL (ref 0–0.51)
EOSINOPHIL NFR BLD: 0.5 % (ref 0–6.9)
ERYTHROCYTE [DISTWIDTH] IN BLOOD BY AUTOMATED COUNT: 37.4 FL (ref 35.9–50)
GFR SERPLBLD CREATININE-BSD FMLA CKD-EPI: 114 ML/MIN/1.73 M 2
GLOBULIN SER CALC-MCNC: 3.5 G/DL (ref 1.9–3.5)
GLUCOSE SERPL-MCNC: 109 MG/DL (ref 65–99)
HCT VFR BLD AUTO: 52 % (ref 42–52)
HGB BLD-MCNC: 17 G/DL (ref 14–18)
IMM GRANULOCYTES # BLD AUTO: 0.08 K/UL (ref 0–0.11)
IMM GRANULOCYTES NFR BLD AUTO: 0.8 % (ref 0–0.9)
LYMPHOCYTES # BLD AUTO: 1.66 K/UL (ref 1–4.8)
LYMPHOCYTES NFR BLD: 16.5 % (ref 22–41)
MCH RBC QN AUTO: 29.1 PG (ref 27–33)
MCHC RBC AUTO-ENTMCNC: 32.7 G/DL (ref 32.3–36.5)
MCV RBC AUTO: 89 FL (ref 81.4–97.8)
MONOCYTES # BLD AUTO: 0.63 K/UL (ref 0–0.85)
MONOCYTES NFR BLD AUTO: 6.3 % (ref 0–13.4)
NEUTROPHILS # BLD AUTO: 7.62 K/UL (ref 1.82–7.42)
NEUTROPHILS NFR BLD: 75.6 % (ref 44–72)
NRBC # BLD AUTO: 0 K/UL
NRBC BLD-RTO: 0 /100 WBC (ref 0–0.2)
PLATELET # BLD AUTO: 252 K/UL (ref 164–446)
PMV BLD AUTO: 10 FL (ref 9–12.9)
POTASSIUM SERPL-SCNC: 4 MMOL/L (ref 3.6–5.5)
PROT SERPL-MCNC: 7.8 G/DL (ref 6–8.2)
RBC # BLD AUTO: 5.84 M/UL (ref 4.7–6.1)
SODIUM SERPL-SCNC: 140 MMOL/L (ref 135–145)
TROPONIN T SERPL-MCNC: <6 NG/L (ref 6–19)
TROPONIN T SERPL-MCNC: <6 NG/L (ref 6–19)
TSH SERPL DL<=0.005 MIU/L-ACNC: 2.59 UIU/ML (ref 0.38–5.33)
WBC # BLD AUTO: 10.1 K/UL (ref 4.8–10.8)

## 2023-11-14 PROCEDURE — 99285 EMERGENCY DEPT VISIT HI MDM: CPT

## 2023-11-14 PROCEDURE — 700111 HCHG RX REV CODE 636 W/ 250 OVERRIDE (IP): Performed by: EMERGENCY MEDICINE

## 2023-11-14 PROCEDURE — 93005 ELECTROCARDIOGRAM TRACING: CPT | Performed by: EMERGENCY MEDICINE

## 2023-11-14 PROCEDURE — 93005 ELECTROCARDIOGRAM TRACING: CPT

## 2023-11-14 PROCEDURE — 700105 HCHG RX REV CODE 258: Performed by: EMERGENCY MEDICINE

## 2023-11-14 PROCEDURE — 84443 ASSAY THYROID STIM HORMONE: CPT

## 2023-11-14 PROCEDURE — 85025 COMPLETE CBC W/AUTO DIFF WBC: CPT

## 2023-11-14 PROCEDURE — 96374 THER/PROPH/DIAG INJ IV PUSH: CPT

## 2023-11-14 PROCEDURE — 71045 X-RAY EXAM CHEST 1 VIEW: CPT

## 2023-11-14 PROCEDURE — 84484 ASSAY OF TROPONIN QUANT: CPT

## 2023-11-14 PROCEDURE — 36415 COLL VENOUS BLD VENIPUNCTURE: CPT

## 2023-11-14 PROCEDURE — 80053 COMPREHEN METABOLIC PANEL: CPT

## 2023-11-14 RX ORDER — SODIUM CHLORIDE 9 MG/ML
1000 INJECTION, SOLUTION INTRAVENOUS ONCE
Status: COMPLETED | OUTPATIENT
Start: 2023-11-14 | End: 2023-11-14

## 2023-11-14 RX ORDER — LORAZEPAM 2 MG/ML
0.5 INJECTION INTRAMUSCULAR ONCE
Status: COMPLETED | OUTPATIENT
Start: 2023-11-14 | End: 2023-11-14

## 2023-11-14 RX ADMIN — SODIUM CHLORIDE 1000 ML: 9 INJECTION, SOLUTION INTRAVENOUS at 15:58

## 2023-11-14 RX ADMIN — LORAZEPAM 0.5 MG: 2 INJECTION INTRAMUSCULAR; INTRAVENOUS at 15:58

## 2023-11-14 ASSESSMENT — FIBROSIS 4 INDEX: FIB4 SCORE: 0.71

## 2023-11-14 NOTE — ED TRIAGE NOTES
"Chief Complaint   Patient presents with    Chest Pain    Shortness of Breath    Headache    Irregular Heart Beat     36 yo male ambulates to triage with reports of feeling elevated HR in the 150-160's since last night.  Reports he feels lightheaded and gets a Headache.  Reports the elevated HR feels worse when he is trying to go to the bathroom and doesn't change when he is lying down.  Denies N/V/D here last week for an allergic reaction.      BP (!) 147/97   Pulse 97   Temp 36.9 °C (98.4 °F) (Temporal)   Resp 18   Ht 1.702 m (5' 7\")   Wt 60.6 kg (133 lb 9.6 oz)   SpO2 97%   BMI 20.92 kg/m²     "

## 2023-11-14 NOTE — ED PROVIDER NOTES
ED Provider Note    CHIEF COMPLAINT  Chief Complaint   Patient presents with    Chest Pain    Shortness of Breath    Headache    Irregular Heart Beat     36 yo male ambulates to triage with reports of feeling elevated HR in the 150-160's since last night.  Reports he feels lightheaded and gets a Headache.  Reports the elevated HR feels worse when he is trying to go to the bathroom and doesn't change when he is lying down.  Denies N/V/D here last week for an allergic reaction.         EXTERNAL RECORDS REVIEWED  Outpatient Notes seen in the ER 5 days ago for allergic reaction, was put on a Medrol Dosepak.    HPI/ROS  LIMITATION TO HISTORY   Select: Language  ,  Used   OUTSIDE HISTORIAN(S):  Family cousin at the bedside helping interpret and also giving additional information.    Jeanmarie Amaya is a 37 y.o. male who presents to the ED secondary to rapid heart rate.  The patient states that he has been taking his Medrol Dosepak that was recently prescribed to him for an allergic reaction.  The patient states that last night he felt his heart rate jumped up to 150 while he was lying still.  He states that that went away and then he had another episode earlier today.  He states increasing anxiety and stress.  The patient states he feels short of breath with it.  No history of blood clots to his lungs or legs.  The patient denies any fevers, cough, runny nose.  The patient has not had any nausea vomiting but does feel somewhat dehydrated.    PAST MEDICAL HISTORY   has a past medical history of GERD (gastroesophageal reflux disease) and Hyperlipidemia.    SURGICAL HISTORY   has a past surgical history that includes other.    FAMILY HISTORY  Family History   Problem Relation Age of Onset    Cancer Mother         Laryngeal cancer    Cancer Sister         colon cancer at 52 year age.       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 (Registered Nurse) on 11/14/23 at 1249  Med List Status: Not Addressed     Medication Last Dose Status   atorvastatin (LIPITOR) 10 MG Tab  Active   cetirizine (ZYRTEC) 10 MG Tab  Active   EPINEPHrine 0.3 MG/0.3ML Solution Prefilled Syringe  Active   famotidine (PEPCID) 20 MG Tab  Active   fluticasone (VERAMYST) 27.5 MCG/SPRAY nasal spray  Active   methylPREDNISolone (MEDROL DOSEPAK) 4 MG Tablet Therapy Pack  Active   SUMAtriptan (IMITREX) 25 MG Tab tablet  Active                    ALLERGIES  No Known Allergies    PHYSICAL EXAM  VITAL SIGNS: /75   Pulse 77   Temp 36.9 °C (98.4 °F) (Temporal)   Resp 18   Ht 1.702 m (5' 7\")   Wt 60.6 kg (133 lb 9.6 oz)   SpO2 100%   BMI 20.92 kg/m²    Well-developed and nourished 37-year-old male who appears in mild distress  Atraumatic, normocephalic, oropharynx with some dry mucous membranes  Regular rate and rhythm, borderline tachycardic  Clear to auscultation  Abdomen soft nontender  Extremities no cords    DIAGNOSTIC STUDIES / PROCEDURES    Results for orders placed or performed during the hospital encounter of 11/14/23   CBC with Differential   Result Value Ref Range    WBC 10.1 4.8 - 10.8 K/uL    RBC 5.84 4.70 - 6.10 M/uL    Hemoglobin 17.0 14.0 - 18.0 g/dL    Hematocrit 52.0 42.0 - 52.0 %    MCV 89.0 81.4 - 97.8 fL    MCH 29.1 27.0 - 33.0 pg    MCHC 32.7 32.3 - 36.5 g/dL    RDW 37.4 35.9 - 50.0 fL    Platelet Count 252 164 - 446 K/uL    MPV 10.0 9.0 - 12.9 fL    Neutrophils-Polys 75.60 (H) 44.00 - 72.00 %    Lymphocytes 16.50 (L) 22.00 - 41.00 %    Monocytes 6.30 0.00 - 13.40 %    Eosinophils 0.50 0.00 - 6.90 %    Basophils 0.30 0.00 - " 1.80 %    Immature Granulocytes 0.80 0.00 - 0.90 %    Nucleated RBC 0.00 0.00 - 0.20 /100 WBC    Neutrophils (Absolute) 7.62 (H) 1.82 - 7.42 K/uL    Lymphs (Absolute) 1.66 1.00 - 4.80 K/uL    Monos (Absolute) 0.63 0.00 - 0.85 K/uL    Eos (Absolute) 0.05 0.00 - 0.51 K/uL    Baso (Absolute) 0.03 0.00 - 0.12 K/uL    Immature Granulocytes (abs) 0.08 0.00 - 0.11 K/uL    NRBC (Absolute) 0.00 K/uL   Complete Metabolic Panel (CMP)   Result Value Ref Range    Sodium 140 135 - 145 mmol/L    Potassium 4.0 3.6 - 5.5 mmol/L    Chloride 104 96 - 112 mmol/L    Co2 23 20 - 33 mmol/L    Anion Gap 13.0 7.0 - 16.0    Glucose 109 (H) 65 - 99 mg/dL    Bun 13 8 - 22 mg/dL    Creatinine 0.83 0.50 - 1.40 mg/dL    Calcium 9.6 8.4 - 10.2 mg/dL    Correct Calcium 9.4 8.5 - 10.5 mg/dL    AST(SGOT) 17 12 - 45 U/L    ALT(SGPT) 36 2 - 50 U/L    Alkaline Phosphatase 66 30 - 99 U/L    Total Bilirubin 0.3 0.1 - 1.5 mg/dL    Albumin 4.3 3.2 - 4.9 g/dL    Total Protein 7.8 6.0 - 8.2 g/dL    Globulin 3.5 1.9 - 3.5 g/dL    A-G Ratio 1.2 g/dL   Troponins NOW   Result Value Ref Range    Troponin T <6 6 - 19 ng/L   ESTIMATED GFR   Result Value Ref Range    GFR (CKD-EPI) 114 >60 mL/min/1.73 m 2   EKG   Result Value Ref Range    Report       [facility] Emergency Dept.    Test Date:  2023  Pt Name:    REJI CERNA               Department: [facility]  MRN:        [id number]                      Room:  Gender:     Male                         Technician: [id number]  :        1985                   Requested By:ER TRIAGE PROTOCOL  Order #:    [id number]                    Kyle MD: CHICO BALTAZAR D.O.    Measurements  Intervals                                Axis  Rate:       105                          P:          81  OR:         115                          QRS:        74  QRSD:       83                           T:          -49  QT:         330  QTc:        437    Interpretive Statements  Sinus tachycardia  Consider right  atrial enlargement  Probable LVH with secondary repol abnrm  Baseline wander in lead(s) V3  Compared to ECG 05/27/2023 21:51:10  Sinus rhythm no longer present  Possible ischemia no longer present  Electronically Signed On 11- 3:45 PM by Willem Martin         RADIOLOGY  I have independently interpreted the diagnostic imaging associated with this visit and am waiting the final reading from the radiologist.   My preliminary interpretation is as follows: No pneumonia  Radiologist interpretation:   DX-CHEST-PORTABLE (1 VIEW)   Final Result         1. No acute cardiopulmonary abnormalities are identified.            COURSE & MEDICAL DECISION MAKING    ED Observation Status? No; Patient does not meet criteria for ED Observation.     INITIAL ASSESSMENT, COURSE AND PLAN  Care Narrative: Patient is coming in with increased heart rate.  The patient is in normal sinus rhythm here but borderline tachycardic.  I believe this is likely a result of an adverse effect from the steroids.  I did ambulate the patient around the emergency department his heart rate got up to approximately 110..  We will give the patient IV fluids for dry mucous membranes and tachycardia, we will check a thyroid, basic laboratory tests are unremarkable at this point time.  The patient's troponin and EKG are unremarkable.    Patient is feeling improved after Ativan, his thyroid is normal.  His heart rate is dropped down to 80.  The patient will be discharged home, discussed with him to stop his steroids.  Patient will follow-up with cardiology, placed a referral, have the patient return with worsening symptoms.  The patient's heart score is 1      DISPOSITION AND DISCUSSIONS    Discussion of management with other [facility] or appropriate source(s): Pharmacy about steroids      Decision tools and prescription drugs considered including, but not limited to: HEART Score 1 .    FINAL DIAGNOSIS  1. Tachycardia    2. Palpitations    3. Shortness of breath     4. Adverse effect of drug, initial encounter           Electronically signed by: Willem Martin M.D., 11/14/2023 3:25 PM

## 2023-11-17 ENCOUNTER — APPOINTMENT (OUTPATIENT)
Dept: CARDIOLOGY | Facility: MEDICAL CENTER | Age: 38
End: 2023-11-17
Attending: EMERGENCY MEDICINE

## 2023-11-17 ASSESSMENT — ENCOUNTER SYMPTOMS
RESPIRATORY NEGATIVE: 1
CARDIOVASCULAR NEGATIVE: 1
HEADACHES: 0
NAUSEA: 0
BRUISES/BLEEDS EASILY: 0
CLAUDICATION: 0
DIZZINESS: 0
MUSCULOSKELETAL NEGATIVE: 1
FOCAL WEAKNESS: 0
CHILLS: 0
DEPRESSION: 0
NEUROLOGICAL NEGATIVE: 1
WEAKNESS: 0
PSYCHIATRIC NEGATIVE: 1
COUGH: 0
FEVER: 0
EYES NEGATIVE: 1
VOMITING: 0
PALPITATIONS: 0
GASTROINTESTINAL NEGATIVE: 1
DOUBLE VISION: 0
ABDOMINAL PAIN: 0
WEIGHT LOSS: 0
NERVOUS/ANXIOUS: 0
CONSTITUTIONAL NEGATIVE: 1
BLURRED VISION: 0
SHORTNESS OF BREATH: 0
MYALGIAS: 0

## 2023-11-17 NOTE — PROGRESS NOTES
No chief complaint on file.      Subjective     Jeanmarie Amaya is a 37 y.o. male who presents today as a consult from Willem Clark for palpitations.     Thank you for allowing me to evaluate Ms. Amaya, who as you know is a 37 year old female with . She was recently evaluated at [facility] Emergency Room on 11/14/23 for palpitations.     Past Medical History:   Diagnosis Date    GERD (gastroesophageal reflux disease)     Hyperlipidemia      Past Surgical History:   Procedure Laterality Date    OTHER      chest tube placement     Family History   Problem Relation Age of Onset    Cancer Mother         Laryngeal cancer    Cancer Sister         colon cancer at 52 year age.     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[employer], stock shelves   Other Topics Concern    Not on file   Social History Narrative    Not on file     Social Determinants of Health     Financial Resource Strain: Not on file   Food Insecurity: Not on file   Transportation Needs: Not on file   Physical Activity: Not on file   Stress: Not on file   Social Connections: Not on file   Intimate Partner Violence: Not on file   Housing Stability: Not on file     No Known Allergies    (Medications reviewed.)  Outpatient Encounter Medications as of 11/17/2023   Medication Sig Dispense Refill    cetirizine (ZYRTEC) 10 MG Tab Take 1 Tablet by mouth every day. 30 Tablet 0    methylPREDNISolone (MEDROL DOSEPAK) 4 MG Tablet  Therapy Pack Take as directed 21 Each 0    EPINEPHrine 0.3 MG/0.3ML Solution Prefilled Syringe Inject the contents of the epipen into the thigh, hold for 3 seconds and release from thigh as needed for anaphylaxis. 1 Each 0    famotidine (PEPCID) 20 MG Tab Take 1 Tablet by mouth 2 times a day. 30 Tablet 0    atorvastatin (LIPITOR) 10 MG Tab Take 1 tablet by mouth every day. 90 tablet 3    SUMAtriptan (IMITREX) 25 MG Tab tablet Take 1 tablet by mouth as needed for headache, can take another tablet by mouth in 2 hours if not better, Max: 100 mg/day 30 Tab 1    fluticasone (VERAMYST) 27.5 MCG/SPRAY nasal spray Spray 2 Sprays in nose every day.       No facility-administered encounter medications on file as of 11/17/2023.     Review of Systems   Constitutional: Negative.  Negative for chills, fever, malaise/fatigue and weight loss.   HENT: Negative.  Negative for hearing loss.    Eyes: Negative.  Negative for blurred vision and double vision.   Respiratory: Negative.  Negative for cough and shortness of breath.    Cardiovascular: Negative.  Negative for chest pain, palpitations, claudication and leg swelling.   Gastrointestinal: Negative.  Negative for abdominal pain, nausea and vomiting.   Genitourinary: Negative.  Negative for dysuria and urgency.   Musculoskeletal: Negative.  Negative for joint pain and myalgias.   Skin: Negative.  Negative for itching and rash.   Neurological: Negative.  Negative for dizziness, focal weakness, weakness and headaches.   Endo/Heme/Allergies: Negative.  Does not bruise/bleed easily.   Psychiatric/Behavioral: Negative.  Negative for depression. The patient is not nervous/anxious.               Objective     There were no vitals taken for this visit.    Physical Exam  Constitutional:       Appearance: Normal appearance. He is well-developed and normal weight.   HENT:      Head: Normocephalic and atraumatic.   Neck:      Vascular: No JVD.   Cardiovascular:      Rate and Rhythm: Normal rate  and regular rhythm.      Heart sounds: Normal heart sounds.   Pulmonary:      Effort: Pulmonary effort is normal.      Breath sounds: Normal breath sounds.   Abdominal:      General: Bowel sounds are normal.      Palpations: Abdomen is soft.      Comments: No hepatosplenomegaly.   Musculoskeletal:         General: Normal range of motion.   Lymphadenopathy:      Cervical: No cervical adenopathy.   Skin:     General: Skin is warm and dry.   Neurological:      Mental Status: He is alert and oriented to person, place, and time.            CARDIAC STUDIES/PROCEDURES:    EKG performed on (11/14/23) was reviewed: EKG personally interpreted shows sinus tachycardia.     Assessment & Plan     No diagnosis found.    Medical Decision Making: Today's Assessment/Status/Plan: